# Patient Record
Sex: OTHER/UNKNOWN | Race: WHITE | NOT HISPANIC OR LATINO | Employment: FULL TIME | URBAN - METROPOLITAN AREA
[De-identification: names, ages, dates, MRNs, and addresses within clinical notes are randomized per-mention and may not be internally consistent; named-entity substitution may affect disease eponyms.]

---

## 2023-05-24 ENCOUNTER — OFFICE VISIT (OUTPATIENT)
Dept: OBGYN CLINIC | Facility: MEDICAL CENTER | Age: 23
End: 2023-05-24

## 2023-05-24 VITALS
SYSTOLIC BLOOD PRESSURE: 102 MMHG | HEIGHT: 66 IN | BODY MASS INDEX: 24.01 KG/M2 | DIASTOLIC BLOOD PRESSURE: 72 MMHG | WEIGHT: 149.4 LBS

## 2023-05-24 DIAGNOSIS — Z30.09 ENCOUNTER FOR COUNSELING REGARDING CONTRACEPTION: Primary | ICD-10-CM

## 2023-05-24 RX ORDER — NORETHINDRONE ACETATE AND ETHINYL ESTRADIOL 1; 5 MG/1; UG/1
1 TABLET ORAL DAILY
COMMUNITY
End: 2023-05-24 | Stop reason: CLARIF

## 2023-05-24 NOTE — PROGRESS NOTES
Assessment:  24 y o  Denton Ramirez who presents as consult for IUD  Plan:  Diagnoses and all orders for this visit:    Encounter for counseling regarding contraception  - Would like IUD - favoring Mirena vs [facility] for checking insurance reviewed  - Options for procedural analgesia discussed -- cycling timing vs misoprostol, valium oral sedation, and/or paracervical block  Declines meds presently (but may call if desired) and will time with menses  - Return for placement      __________________________________________________________________    Subjective   Gautamstephaniecarla Carly is a 24 y o  Denton Ramirez who presents to discuss contraception  Currently on Vienva  FemHRT 1/5 listed in chart, but pt notes same OCP for many years and doing well  Generally takes OCP continuously for a menses q3mo  No BTB  Menses last approx 4-5d, last day very light  Mild cramping  No PMS symptoms  OCP well tolerated  Considering switch to IUD for ease of use and psb menstrual suppression  GYN hx unremarkable  No known GYN pathology  Pap within 3yrs and normal  (2021 by pt portal) Sexually active, male single partner  No hx STD or concerns for same  No discharge or pelvic pain  Reviewed options for contraceptive in brief, with a thorough discussion on IUDs based on pt preference  Discussed copper vs levonorgestrel IUD  Patient prefers to medically suppress menses where able and is most interested in [place]  Discussed Mirena vs Kyleena/Shantel in terms of size and comfort in nulliparous pt  Advised many patients wear Mirena comfortably, but in some it can cause consistent cramping necessitating removal  Mirena is more likely to cause amenorrhea due to higher dose, but amenorrhea is possible with kyleena/sklya as well  Discussed placement procedure in detail, incl procedure, risks, and ways to mitigate discomfort  Patient had questions answered to her satisfaction   Patient would like to proceed with progestin-based IUD but unsure which "type considering, Will consider options prior to placement  The following portions of the patient's history were reviewed and updated as appropriate: allergies, current medications, past medical history, past social history, past surgical history and problem list     Review of Systems  Review of Systems   Constitutional: Negative for chills, fatigue and fever  Respiratory: Negative for shortness of breath  Cardiovascular: Negative for chest pain and palpitations  Gastrointestinal: Negative for abdominal distention, abdominal pain, nausea and vomiting  Genitourinary: Negative for dysuria, flank pain, menstrual problem, pelvic pain, vaginal bleeding, vaginal discharge and vaginal pain  Skin: Negative for rash and wound  Neurological: Negative for dizziness, light-headedness and headaches  Objective  /72   Ht 5' 6\" (1 676 m)   Wt 67 8 kg (149 lb 6 4 oz)   LMP 02/15/2023 (Approximate)   BMI 24 11 kg/m²      Physical Exam:  Physical Exam  Constitutional:       General: Lovenia Duane is not in acute distress  Appearance: Normal appearance  Lovenia Duane is not ill-appearing, toxic-appearing or diaphoretic  Eyes:      General: No scleral icterus  Right eye: No discharge  Left eye: No discharge  Conjunctiva/sclera: Conjunctivae normal    Cardiovascular:      Rate and Rhythm: Normal rate  Pulmonary:      Effort: Pulmonary effort is normal  No respiratory distress  Abdominal:      General: There is no distension  Palpations: There is no mass  Tenderness: There is no abdominal tenderness  There is no guarding or rebound  Hernia: No hernia is present  Musculoskeletal:         General: No swelling  Skin:     General: Skin is warm and dry  Coloration: Skin is not jaundiced or pale  Findings: No bruising or erythema  Neurological:      Mental Status: Lovenia Duane is alert     Psychiatric:         Mood and Affect: Mood normal     " Behavior: Behavior normal          Thought Content:  Thought content normal          Judgment: Judgment normal